# Patient Record
Sex: FEMALE | Race: WHITE | NOT HISPANIC OR LATINO | URBAN - NONMETROPOLITAN AREA
[De-identification: names, ages, dates, MRNs, and addresses within clinical notes are randomized per-mention and may not be internally consistent; named-entity substitution may affect disease eponyms.]

---

## 2024-04-04 ENCOUNTER — OFFICE (OUTPATIENT)
Dept: URBAN - NONMETROPOLITAN AREA CLINIC 1 | Facility: CLINIC | Age: 49
End: 2024-04-04

## 2024-04-04 VITALS
WEIGHT: 167 LBS | SYSTOLIC BLOOD PRESSURE: 115 MMHG | DIASTOLIC BLOOD PRESSURE: 83 MMHG | HEIGHT: 63 IN | SYSTOLIC BLOOD PRESSURE: 125 MMHG | HEART RATE: 83 BPM | DIASTOLIC BLOOD PRESSURE: 98 MMHG

## 2024-04-04 DIAGNOSIS — R10.13 EPIGASTRIC PAIN: ICD-10-CM

## 2024-04-04 DIAGNOSIS — R11.0 NAUSEA: ICD-10-CM

## 2024-04-04 DIAGNOSIS — R68.81 EARLY SATIETY: ICD-10-CM

## 2024-04-04 PROCEDURE — 36415 COLL VENOUS BLD VENIPUNCTURE: CPT | Performed by: NURSE PRACTITIONER

## 2024-04-04 PROCEDURE — 99204 OFFICE O/P NEW MOD 45 MIN: CPT | Performed by: NURSE PRACTITIONER

## 2024-04-04 NOTE — SERVICEHPINOTES
Patient with a history of rheumatoid arthritis presents to the clinic today for ER follow-up.  She went to the emergency room in March 2024 complaining of dyspepsia, nausea, and epigastric pain. patient's liver enzymes, T bili, lipase all normal.  No anemia or leukocytosis noted.  Abdominal ultrasound revealed mild fatty liver, common bile duct 5 mm, gallbladder contracted somewhat limiting evaluation, no stones or sludge, no true wall thickening or pericholecystic fluid.  Negative Stover's sign.  Patient was started on omeprazole 20 mg p.o. once daily and was advised to follow up with GI.  She reports having an EGD and colonoscopy in 2017 at [facility] and denies any abnormalities ( no report to review).  Previous EGD by Dr. Mckinney back on 03/2003 revealed a 2 cm hiatal hernia in the esophagus and normal esophagus, stomach, duodenum.  She reports she feels as if her ribcage is on top of her abdominal pushing it.  She ate a cheeseburger   the other day that triggered upper abdominal and epigastric pain in which she went to the ER 3/24- Reports early satiety, nausea postprandially, and dyspepsia, and increased gas.  She denies typical heartburn s/s or indigestion.  Takes Skyrizi for RA.  She has at least one BM daily, denies straining with defecation.  She denies melena, hematochezia, vomiting, diarrhea, change in BM, unintentional weight loss, dysphagia, or fever.  Denies tobacco, ETOH use or NSAID use.  Denies cardiac stents, anticoagulation therapy, supplemental oxygenation use, or dialysis. br
brER labs 03/09/2024 brAlk-phos 59, ALT 19, AST 29, T bili 0.5, albumin 4.1, lipase 85-all normal.  No anemia or leukocytosis.  
br
br  Abd US 3/9/24
br FINDINGS:brThere is some mild fatty infiltration liver. The visualized hepatic brparenchyma is otherwise unremarkable. The portal vein is patent with brnormal hepatopetal flow and a normal portal venous waveform. There bris no intrahepatic or extrahepatic biliary ductal dilatation. The brcommon bile duct measures 5 mm in diameter.brGallbladder is contracted, somewhat limiting evaluation. No stones bror sludge identified. No definitive true wall thickening or brpericholecystic fluid identified. There is no positive sonographic brMurphy sign.brThe visualized portions of the pancreas and right kidney are normal. brNo free fluid is identified in the right upper quadrant.brIMPRESSION:brMild fatty infiltration liver. Gallbladder is contracted, somewhat brlimiting evaluation. No stones or sludge. No definitive secondary brsigns for inflammation.
br
br EGD by Dr. Vincent 3/5/2013
brFindings:  Esophagus: There was a 2 cm hiatus hernia visible in the esophagus. Otherwise, the esophagus appeared to be normal. A biopsy was taken from the esophagus. Stomach: The entire stomach appeared to be normal. A biopsy was taken from the stomach. Duodenum: The duodenal bulb, 2nd portion of the duodenum, and 3rd portion of the duodenum appeared to be normal.
brDIAGNOSIS:br  MICROSCOPIC:br   1. ESOPHAGUS, BIOPSY -br      Fragments from the gastroesophageal junction with chronic gastric carditisbr       no Frankel's esophagus is identified.br   2. STOMACH, BIOPSY -br      Fragments of histologically negative gastric mucosa Helicobacter pyloribr       immunostain is negative for microorganisms.

## 2024-04-04 NOTE — SERVICENOTES
Risks of procedure explained to patient she wishes to proceed 
We will obtain labs today to trend liver enzymes and lipase in the setting of epigastric pain, nausea, dyspepsia
EGD for signs and symptoms as above to rule out H pylori, PUD, obstruction,
HIDA scan in setting of contracted gallbladder and signs and symptoms as above
Continue Prilosec as prescribed for GERD
Avoid all NSAIDs
More to follow pending above workup.

## 2024-05-30 ENCOUNTER — ON CAMPUS - OUTPATIENT (OUTPATIENT)
Dept: URBAN - NONMETROPOLITAN AREA HOSPITAL 34 | Facility: HOSPITAL | Age: 49
End: 2024-05-30

## 2024-05-30 DIAGNOSIS — K31.7 POLYP OF STOMACH AND DUODENUM: ICD-10-CM

## 2024-05-30 DIAGNOSIS — K29.50 UNSPECIFIED CHRONIC GASTRITIS WITHOUT BLEEDING: ICD-10-CM

## 2024-05-30 PROCEDURE — 43239 EGD BIOPSY SINGLE/MULTIPLE: CPT | Performed by: INTERNAL MEDICINE

## 2024-08-14 ENCOUNTER — OFFICE (OUTPATIENT)
Dept: URBAN - NONMETROPOLITAN AREA CLINIC 1 | Facility: CLINIC | Age: 49
End: 2024-08-14
Payer: COMMERCIAL

## 2024-08-14 VITALS
HEART RATE: 67 BPM | DIASTOLIC BLOOD PRESSURE: 87 MMHG | WEIGHT: 156 LBS | SYSTOLIC BLOOD PRESSURE: 121 MMHG | HEIGHT: 63 IN

## 2024-08-14 DIAGNOSIS — R13.10 DYSPHAGIA, UNSPECIFIED: ICD-10-CM

## 2024-08-14 DIAGNOSIS — K29.50 UNSPECIFIED CHRONIC GASTRITIS WITHOUT BLEEDING: ICD-10-CM

## 2024-08-14 PROCEDURE — 99213 OFFICE O/P EST LOW 20 MIN: CPT | Performed by: NURSE PRACTITIONER

## 2024-08-14 NOTE — SERVICEHPINOTES
Patient presents to the clinic today for follow up of EGD and abdominal pain.  She had a EGD 5/2024 mild chronic gastritis negative DAVE.  NM HIDA scan 5/2024 normal.  She reports feeling better.  She does report approximately two weeks ago experienced painful swallowing mid esophagus and felt as if food got stuck, couldn't eat or drink for two days, then subsided on its on.  Not taking Prilosec anymore as she ran out, but is not experiencing any GERD so she does not feel as she needs it.  She has a BM every 2 to 3 days, strains with defecation. Denies any bloody or black stools, nausea has subsided, eating meals per usual.  Denies family history of crc.  
br
daly   EGD by Dr. Pascal 5/2024mild chronic gastritis, DAVE neg.
br NM HIDA 5/2024- normal

## 2024-08-14 NOTE — SERVICENOTES
If s/s persist, call clinic, consider Esophagram w/ tab in future-likely esophageal spasms. 
If GERD s/s return, call clinic and will send in rx for Pantoprazole at that time.
Continue to avoid NSAIDs
Need to retrieve her last colonoscopy and review to ensure she is utd on screening crc.